# Patient Record
Sex: FEMALE | NOT HISPANIC OR LATINO | URBAN - METROPOLITAN AREA
[De-identification: names, ages, dates, MRNs, and addresses within clinical notes are randomized per-mention and may not be internally consistent; named-entity substitution may affect disease eponyms.]

---

## 2017-06-05 ENCOUNTER — IMPORTED ENCOUNTER (OUTPATIENT)
Dept: URBAN - METROPOLITAN AREA CLINIC 31 | Facility: CLINIC | Age: 77
End: 2017-06-05

## 2017-06-05 PROBLEM — H25.013: Noted: 2017-06-05

## 2017-06-05 PROBLEM — H04.123: Noted: 2017-06-05

## 2017-06-05 PROCEDURE — 92310 CONTACT LENS FITTING OU: CPT

## 2017-06-05 PROCEDURE — 92015 DETERMINE REFRACTIVE STATE: CPT

## 2017-06-05 PROCEDURE — 92014 COMPRE OPH EXAM EST PT 1/>: CPT

## 2017-06-05 NOTE — PATIENT DISCUSSION
1.  Cataract OU: ---NS and PSC-  getting little worse. Explained how cataracts can effect vision. Recommend clinical observation. The patient was advised to contact us if any change or worsening of vision. 2. Dry Eye OU:  Continue current management with Artificial Tears. spk ou3. Cont with current cls Air OPtix MONO 2.25/5. 25. Disc going with AO with Hudraglyde but had no samples in stock . better for oxygen and dryness. Eval 90.4. Atopic Dermatitis on lids-  Pt has problems with it off and on. Cont with Maxtrol mario alberto prn for lids. Needs to use about every 3-4 weeks. Rx Maxtrol mario alberto prn5. Gave copies of rx cls and gls. 6.   RTN 1 yr CE

## 2018-05-23 ENCOUNTER — IMPORTED ENCOUNTER (OUTPATIENT)
Dept: URBAN - METROPOLITAN AREA CLINIC 31 | Facility: CLINIC | Age: 78
End: 2018-05-23

## 2018-05-23 PROBLEM — H25.813: Noted: 2018-05-23

## 2018-05-23 PROBLEM — H25.013: Noted: 2018-05-23

## 2018-05-23 PROBLEM — H04.123: Noted: 2018-05-23

## 2018-05-23 PROBLEM — H01.119: Noted: 2018-05-23

## 2018-05-23 PROCEDURE — 92015 DETERMINE REFRACTIVE STATE: CPT

## 2018-05-23 PROCEDURE — 92014 COMPRE OPH EXAM EST PT 1/>: CPT

## 2018-05-23 NOTE — PATIENT DISCUSSION
1. Combined Types of Cataract OU:   getting worse and affecting visions. Rx change is not helping to improve visions. Explained how cataracts can effect vision. Schedule eval-  Pt likes MONO. OD distance OS Near2. Dry Eye OU:  Continue current management with Artificial Tears. spk ou3. Been wearing  Air OPtix Hydraglyde MONO 2.25/5. 25.  4.  Atopic Dermatitis on lids-  Pt has problems with it off and on. Gave rx for Lotemax mario alberto for lids. POss allergy to  Maxitrol. Needs to use about every 3-4 weeks. 5.  Corneal deposits due to the Amiodarone6. Schedule Cataract eval with DR Barger-  Pt has had MONO for many years and very happy. OD is distance.

## 2018-07-05 ENCOUNTER — IMPORTED ENCOUNTER (OUTPATIENT)
Dept: URBAN - METROPOLITAN AREA CLINIC 31 | Facility: CLINIC | Age: 78
End: 2018-07-05

## 2018-07-05 PROBLEM — H25.13: Noted: 2018-07-05

## 2018-07-05 PROCEDURE — 99214 OFFICE O/P EST MOD 30 MIN: CPT

## 2018-07-05 NOTE — PATIENT DISCUSSION
Cataract OU: Discussed the risks benefits alternatives and limitations of cataract surgery including infection bleeding loss of vision retinal tears detachment. The patient stated a full understanding and a desire to proceed with the surgery in both eyes. Refractive options were reviewed. Patient has elected to have monovision and has tried monovision in the past.  The patient will have ORA guidance to confirm lens power choice. There is no guarantee of perfect uncorrected acuity and the possible need for enhancement was discussed. The patient may need glasses for night driving and to fine tune the vision. OD/OS. OD pore Tappen OS pore reading. No CL wear 7 days prior to Admit. Needs medical clearance for A-Fib. Return for an appointment for [address] [place] [address] with Dr. Jensen Bennett.

## 2018-08-14 ENCOUNTER — IMPORTED ENCOUNTER (OUTPATIENT)
Dept: URBAN - METROPOLITAN AREA CLINIC 31 | Facility: CLINIC | Age: 78
End: 2018-08-14

## 2018-08-14 PROBLEM — H25.13: Noted: 2018-08-14

## 2018-08-14 PROCEDURE — 76519 ECHO EXAM OF EYE: CPT

## 2018-08-14 PROCEDURE — 92025 CPTRIZED CORNEAL TOPOGRAPHY: CPT

## 2018-08-14 NOTE — PATIENT DISCUSSION
Cataract OU: Discussed the risks benefits alternatives and limitations of cataract surgery including infection bleeding loss of vision retinal tears detachment. The patient stated a full understanding and a desire to proceed with the surgery in both eyes. Refractive options were reviewed. Patient has elected to have monovision and has tried monovision in the past.  The patient will have ORA guidance to confirm lens power choice. There is no guarantee of perfect uncorrected acuity and the possible need for enhancement was discussed. The patient may need glasses for night driving and to fine tune the vision.  Option of CRI OD for distance reviewed and pt has elected to proceed

## 2018-08-28 ENCOUNTER — IMPORTED ENCOUNTER (OUTPATIENT)
Dept: URBAN - METROPOLITAN AREA CLINIC 31 | Facility: CLINIC | Age: 78
End: 2018-08-28

## 2018-08-28 PROBLEM — Z96.1: Noted: 2018-08-28

## 2018-08-28 PROCEDURE — 99024 POSTOP FOLLOW-UP VISIT: CPT

## 2018-08-28 NOTE — PATIENT DISCUSSION
Pseudophakia OD - Post-Op Day #1 - Cataract Surgery Right Eye (OD) - doing well. Continue po drops as instructed. Call office with symptoms of pain redness or decreased vision right eye.

## 2018-09-04 ENCOUNTER — IMPORTED ENCOUNTER (OUTPATIENT)
Dept: URBAN - METROPOLITAN AREA CLINIC 31 | Facility: CLINIC | Age: 78
End: 2018-09-04

## 2018-09-04 PROBLEM — Z96.1: Noted: 2018-09-04

## 2018-09-04 PROCEDURE — 99024 POSTOP FOLLOW-UP VISIT: CPT

## 2018-09-04 NOTE — PATIENT DISCUSSION
1.  Pseudophakia OD 1 week with CRI-- -Dist OD-MONO Surgery very well healed. Continue to use artificial tears as needed for ocular surface dryness. Finish po drops according to schedule.   2.  OS cat sx 1 week

## 2018-09-11 ENCOUNTER — IMPORTED ENCOUNTER (OUTPATIENT)
Dept: URBAN - METROPOLITAN AREA CLINIC 31 | Facility: CLINIC | Age: 78
End: 2018-09-11

## 2018-09-11 PROBLEM — Z96.1: Noted: 2018-09-11

## 2018-09-11 PROCEDURE — 99024 POSTOP FOLLOW-UP VISIT: CPT

## 2018-09-11 NOTE — PATIENT DISCUSSION
1.  Pseudophakia OD- 2 weeks PO.   dist MONO-- doing great. 2. Pseudophakia OS - Post-Op Day #1 - Cataract Surgery Left Eye (OS) - Near EYE--doing well. Continue po drops as instructed. Call office with symptoms of pain redness or decreased vision left eye. 3. Pt can use OTc prn till healed. corneal edema present.  4.  RTN 1 week PO

## 2018-09-18 ENCOUNTER — IMPORTED ENCOUNTER (OUTPATIENT)
Dept: URBAN - METROPOLITAN AREA CLINIC 31 | Facility: CLINIC | Age: 78
End: 2018-09-18

## 2018-09-18 PROBLEM — Z96.1: Noted: 2018-09-18

## 2018-09-18 PROCEDURE — 99024 POSTOP FOLLOW-UP VISIT: CPT

## 2018-09-18 NOTE — PATIENT DISCUSSION
1.  Pseudophakia OU - IOLs stable. MONO--Monitor. PT doing great and very happy. Finish meds and increase tears qid. 2.   NO rx needed. 3.   RTN 1 mth PO

## 2018-10-24 ENCOUNTER — IMPORTED ENCOUNTER (OUTPATIENT)
Dept: URBAN - METROPOLITAN AREA CLINIC 31 | Facility: CLINIC | Age: 78
End: 2018-10-24

## 2018-10-24 PROBLEM — Z96.1: Noted: 2018-10-24

## 2018-10-24 PROBLEM — H01.119: Noted: 2018-10-24

## 2018-10-24 PROBLEM — H25.013: Noted: 2018-10-24

## 2018-10-24 PROBLEM — H04.123: Noted: 2018-10-24

## 2018-10-24 PROBLEM — H25.813: Noted: 2018-10-24

## 2018-10-24 PROBLEM — H01.113: Noted: 2018-10-24

## 2018-10-24 PROCEDURE — 99024 POSTOP FOLLOW-UP VISIT: CPT

## 2018-10-24 NOTE — PATIENT DISCUSSION
1.  Dry Eye OU:  Continue with Artificial Tears. spk ou2. Atopic Dermatitis on lids-  Od is irritated today---Pt has problems with it off and on. Gave rx for Lotemax mario alberto for lids. POss allergy to  Maxitrol. Needs to use about every 3-4 weeks. 3. Corneal deposits due to the Amiodarone4  Pseudophakia OU - IOLs stable. MONO--Monitor. OD dist and OS Near- PT doing great and very happy. 5. NO rx needed. 6.  RTN 5 mth CE

## 2019-05-22 ENCOUNTER — IMPORTED ENCOUNTER (OUTPATIENT)
Dept: URBAN - METROPOLITAN AREA CLINIC 31 | Facility: CLINIC | Age: 79
End: 2019-05-22

## 2019-05-22 PROBLEM — H25.813: Noted: 2019-05-22

## 2019-05-22 PROBLEM — H01.113: Noted: 2019-05-22

## 2019-05-22 PROBLEM — H01.119: Noted: 2019-05-22

## 2019-05-22 PROBLEM — Z96.1: Noted: 2019-05-22

## 2019-05-22 PROBLEM — H25.013: Noted: 2019-05-22

## 2019-05-22 PROBLEM — H04.123: Noted: 2019-05-22

## 2019-05-22 PROCEDURE — 92014 COMPRE OPH EXAM EST PT 1/>: CPT

## 2019-05-22 NOTE — PATIENT DISCUSSION
1.  Dry Eye OU:  Continue with Artificial Tears. spk ou2. Atopic Dermatitis on lids-  Od is irritated today---Pt has problems with it off and on. Gave rx for Lotemax mario alberto for lids. POss allergy to  Maxitrol. Needs to use about every 3-4 weeks. 3. Corneal deposits due to the Amiodarone4  Pseudophakia OU - IOLs stable. MONO--Monitor. OD dist and OS Near- PT doing great and very happy. Trace PCO OD--5. NO rx needed. 6.  RTN  12 mth CE

## 2020-05-06 ENCOUNTER — IMPORTED ENCOUNTER (OUTPATIENT)
Dept: URBAN - METROPOLITAN AREA CLINIC 31 | Facility: CLINIC | Age: 80
End: 2020-05-06

## 2020-05-06 PROBLEM — H01.113: Noted: 2020-05-06

## 2020-05-06 PROBLEM — H04.123: Noted: 2020-05-06

## 2020-05-06 PROBLEM — Z96.1: Noted: 2020-05-06

## 2020-05-06 PROBLEM — H25.013: Noted: 2020-05-06

## 2020-05-06 PROBLEM — H25.813: Noted: 2020-05-06

## 2020-05-06 PROBLEM — H01.119: Noted: 2020-05-06

## 2020-05-06 PROCEDURE — 92015 DETERMINE REFRACTIVE STATE: CPT

## 2020-05-06 PROCEDURE — 92014 COMPRE OPH EXAM EST PT 1/>: CPT

## 2020-05-06 NOTE — PATIENT DISCUSSION
1.  Dry Eye OU:  Continue with Artificial Tears. spk ou2. Atopic Dermatitis on lids-  Od is irritated today---Pt has problems with it off and on. Gave rx for Lotemax mario alberto for lids. POss allergy to  Maxitrol. Needs to use about every 3-4 weeks. 3. Corneal deposits due to the Amiodarone4  Pseudophakia OU - IOLs stable. MONO--Monitor. OD dist and OS Near- PT doing great and very happy. Trace PCO OD--5. NO rx needed. 6. RTN  12 mth Harish passed 3/19.  Goes to [place] in summer

## 2021-01-07 ENCOUNTER — IMPORTED ENCOUNTER (OUTPATIENT)
Dept: URBAN - METROPOLITAN AREA CLINIC 31 | Facility: CLINIC | Age: 81
End: 2021-01-07

## 2021-01-07 PROBLEM — Z96.1: Noted: 2021-01-07

## 2021-01-07 PROBLEM — H25.013: Noted: 2021-01-07

## 2021-01-07 PROBLEM — H26.492: Noted: 2021-01-07

## 2021-01-07 PROBLEM — H01.113: Noted: 2021-01-07

## 2021-01-07 PROBLEM — H25.813: Noted: 2021-01-07

## 2021-01-07 PROBLEM — H01.119: Noted: 2021-01-07

## 2021-01-07 PROBLEM — H04.123: Noted: 2021-01-07

## 2021-01-07 PROCEDURE — 92014 COMPRE OPH EXAM EST PT 1/>: CPT

## 2021-01-07 PROCEDURE — 92015 DETERMINE REFRACTIVE STATE: CPT

## 2021-01-07 NOTE — PATIENT DISCUSSION
1.  PCO  OS (Posterior Capsule Opacification)   ----PCO is visually significant and impairment of vision does not meet the patient’s functional needs or interferes with activities of daily living. Consult DR Darreld Kussmaul for Yag OS-- wants in [place]. 2. Dry Eye OU:  Continue with Artificial Tears. spk ou3. Atopic Dermatitis on lids-  Od is irritated today---Pt has problems with it off and on. Gave rx for Lotemax mario alberto for lids. POss allergy to  Maxitrol. Needs to use about every 3-4 weeks. 4. Corneal deposits due to the Amiodarone5  Pseudophakia OU - IOLs stable. MONO--Monitor. OD dist/OS Near-   PCO OS affecting visions---6. Consult DR Connor for Yag OS in [place]--- passed 3/19.  Goes to [place] in summer--- Pt is good friends with Nisha Formans

## 2021-01-28 ENCOUNTER — IMPORTED ENCOUNTER (OUTPATIENT)
Dept: URBAN - METROPOLITAN AREA CLINIC 31 | Facility: CLINIC | Age: 81
End: 2021-01-28

## 2021-01-28 PROBLEM — H26.492: Noted: 2021-01-28

## 2021-01-28 PROBLEM — Z96.1: Noted: 2021-01-28

## 2021-01-28 PROBLEM — H04.123: Noted: 2021-01-28

## 2021-01-28 PROCEDURE — 99213 OFFICE O/P EST LOW 20 MIN: CPT

## 2021-01-28 NOTE — PATIENT DISCUSSION
1.  PCO  OS (Posterior Capsule Opacification)   PCO is visually significant and impairment of vision does not meet the patient’s functional needs or interferes with activities of daily living. Risks benefits and alternatives to the Nd:YAG Laser reviewed including elevated IOP immediately postop and retinal tear/detachment. Patient to notify their ophthalmologist promptly if they have a significant change in symptoms such as flashes of light (photopsia) an increase in floaters loss of visual field or decrease in visual acuity after the procedure. Patient will be scheduled in [place] [id number] for Nd:YAG Laser. Schedule Yag Caps OS2. Pseudophakia OU - IOLs stable. Monitor for changes in vision. 3. Dry Eye OU:  Continue current management with Artificial Tears.   4. 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

## 2021-02-12 ENCOUNTER — IMPORTED ENCOUNTER (OUTPATIENT)
Dept: URBAN - METROPOLITAN AREA CLINIC 31 | Facility: CLINIC | Age: 81
End: 2021-02-12

## 2021-02-12 PROBLEM — Z96.1: Noted: 2021-02-12

## 2021-02-12 PROBLEM — H25.013: Noted: 2021-02-12

## 2021-02-12 PROBLEM — Z98.89: Noted: 2021-02-12

## 2021-02-12 PROBLEM — H01.119: Noted: 2021-02-12

## 2021-02-12 PROBLEM — H26.492: Noted: 2021-02-12

## 2021-02-12 PROBLEM — H25.813: Noted: 2021-02-12

## 2021-02-12 PROBLEM — H01.113: Noted: 2021-02-12

## 2021-02-12 PROBLEM — H04.123: Noted: 2021-02-12

## 2021-02-12 PROCEDURE — 99024 POSTOP FOLLOW-UP VISIT: CPT

## 2021-02-12 NOTE — PATIENT DISCUSSION
1.  PCO  OS (Posterior Capsule Opacification)   ----OPen capsule  2. Dry Eye OU:  Continue with Artificial Tears. spk ou3. Atopic Dermatitis on lids-  Od is irritated today---Pt has problems with it off and on. Gave rx for Lotemax mario alberto for lids. POss allergy to  Maxitrol. Needs to use about every 3-4 weeks. 4. Corneal deposits due to the Amiodarone5  Pseudophakia OU - IOLs stable. MONO--Monitor. OD dist /OS Near-   OPen capsule OS---6. RTN 4 mths DFTA --aftre Yag OS--- passed 3/19.  Goes to [place] in summer--- Pt is good friends with Harriet Hernandez

## 2021-02-12 NOTE — PATIENT DISCUSSION
s/p YAG laser capsulotomy for Posterior Capsule Opacification (PCO) in the Left Eye (OS). Doing better. Please contact us if you have a significant change in symptoms such as flashes of light (photopsia) increased floaters decrease/loss of visual field or visual acuity.

## 2021-05-12 ENCOUNTER — IMPORTED ENCOUNTER (OUTPATIENT)
Dept: URBAN - METROPOLITAN AREA CLINIC 31 | Facility: CLINIC | Age: 81
End: 2021-05-12

## 2021-05-12 PROBLEM — Z96.1: Noted: 2021-05-12

## 2021-05-12 PROBLEM — H25.013: Noted: 2021-05-12

## 2021-05-12 PROBLEM — H01.119: Noted: 2021-05-12

## 2021-05-12 PROBLEM — H04.123: Noted: 2021-05-12

## 2021-05-12 PROBLEM — H01.113: Noted: 2021-05-12

## 2021-05-12 PROBLEM — H25.813: Noted: 2021-05-12

## 2021-05-12 PROBLEM — H26.492: Noted: 2021-05-12

## 2021-05-12 PROBLEM — H01.114: Noted: 2021-05-12

## 2021-05-12 PROBLEM — H01.111: Noted: 2021-05-12

## 2021-05-12 PROCEDURE — 99214 OFFICE O/P EST MOD 30 MIN: CPT

## 2021-05-12 NOTE — PATIENT DISCUSSION
1.  Dry Eye OU:  Continue with Artificial Tears. spk ou2. Atopic Dermatitis on lids-  Od is irritated today---Pt has problems with it off and on. Gave rx for Lotemax mario alberto for lids. POss allergy to  Maxitrol. Needs to use about every 3-4 weeks. 3. Corneal deposits due to the Amiodarone4  Pseudophakia OU - IOLs stable. MONO--OD dist /OS Near-OD Capsule clear OS open---5. RTN 1 yr CE   passed 3/19.  Goes to [place] in summer--- Pt is good friends with Robin Temple

## 2022-04-02 ASSESSMENT — TONOMETRY
OS_IOP_MMHG: 12
OS_IOP_MMHG: 17
OD_IOP_MMHG: 18
OS_IOP_MMHG: 16
OD_IOP_MMHG: 17
OD_IOP_MMHG: 16
OD_IOP_MMHG: 16
OD_IOP_MMHG: 12
OD_IOP_MMHG: 16
OD_IOP_MMHG: 15
OS_IOP_MMHG: 15
OS_IOP_MMHG: 10
OS_IOP_MMHG: 16
OD_IOP_MMHG: 10
OS_IOP_MMHG: 17
OD_IOP_MMHG: 16
OS_IOP_MMHG: 16
OS_IOP_MMHG: 16
OS_IOP_MMHG: 17
OD_IOP_MMHG: 19
OS_IOP_MMHG: 16
OD_IOP_MMHG: 14
OS_IOP_MMHG: 16
OD_IOP_MMHG: 16

## 2022-04-02 ASSESSMENT — VISUAL ACUITY
OD_CC: 20/30
OD_GLARE: 20/50
OD_CC: 20/30
OD_CC: 20/30
OS_CC: 20/30
OD_SC: 20/200
OD_SC: 20/80
OD_SC: 20/200
OS_SC: 20/30
OS_CC: 20/60-1
OS_SC: 20/50+3
OU_SC: 20/30
OD_SC: 20/40
OD_CC: 20/25
OD_CC: 20/30-2
OD_CC: 20/30+2
OS_SC: 20/30
OD_CC: 20/30
OS_CC: 20/30
OS_CC: 20/30
OD_SC: 20/50
OS_SC: 20/40
OD_CC: 20/30
OS_SC: 20/30
OS_CC: 20/60
OS_CC: 20/60
OS_SC: 20/30
OS_CC: 20/100
OS_CC: 20/60+2
OS_SC: 20/200
OS_SC: 20/50
OD_CC: 20/30-2
OD_SC: 20/100
OD_SC: 20/200
OS_CC: 20/50
OD_SC: 20/30
OD_CC: 20/40
OS_GLARE: 20/60MED
OS_SC: 20/30
OS_CC: 20/60
OS_CC: 20/100
OS_SC: 20/30
OD_SC: 20/200
OD_CC: 20/30+2
OS_GLARE: 20/50
OS_SC: 20/30
OD_SC: 20/25-2
OD_GLARE: 20/60MED

## 2022-11-02 ENCOUNTER — PREPPED CHART (OUTPATIENT)
Dept: URBAN - METROPOLITAN AREA CLINIC 34 | Facility: CLINIC | Age: 82
End: 2022-11-02

## 2022-11-02 NOTE — PATIENT DISCUSSION
Atopic Dermatitis on lids- Od is irritated today---Pt has problems with it off and on. Gave rx for Lotemax mario alberto for lids. POss allergy to Maxitrol. Needs to use about every 3-4 weeks.

## 2022-11-09 ENCOUNTER — COMPREHENSIVE EXAM (OUTPATIENT)
Dept: URBAN - METROPOLITAN AREA CLINIC 34 | Facility: CLINIC | Age: 82
End: 2022-11-09

## 2022-11-09 DIAGNOSIS — Z96.1: ICD-10-CM

## 2022-11-09 DIAGNOSIS — H04.123: ICD-10-CM

## 2022-11-09 PROCEDURE — 92015 DETERMINE REFRACTIVE STATE: CPT

## 2022-11-09 PROCEDURE — 92014 COMPRE OPH EXAM EST PT 1/>: CPT

## 2022-11-09 ASSESSMENT — VISUAL ACUITY
OU_SC: 20/30
OU_SC: 20/30-2

## 2022-11-09 ASSESSMENT — TONOMETRY
OS_IOP_MMHG: 16
OD_IOP_MMHG: 16

## 2022-11-09 NOTE — PATIENT DISCUSSION
Atopic Dermatitis on lids- NOne lately---Pt has problems with it off and on. Gave rx for Lotemax mario alberto for lids. POss allergy to Maxitrol. Needs to use about every 3-4 weeks.

## 2023-11-10 ENCOUNTER — COMPREHENSIVE EXAM (OUTPATIENT)
Dept: URBAN - METROPOLITAN AREA CLINIC 34 | Facility: CLINIC | Age: 83
End: 2023-11-10

## 2023-11-10 DIAGNOSIS — Z96.1: ICD-10-CM

## 2023-11-10 PROCEDURE — 92014 COMPRE OPH EXAM EST PT 1/>: CPT

## 2023-11-10 PROCEDURE — 92015 DETERMINE REFRACTIVE STATE: CPT

## 2023-11-10 ASSESSMENT — TONOMETRY
OD_IOP_MMHG: 16
OS_IOP_MMHG: 16

## 2023-11-10 ASSESSMENT — VISUAL ACUITY
OS_SC: J3
OD_SC: J16
OD_SC: 20/30
OS_SC: 20/50

## 2024-04-22 NOTE — PATIENT DISCUSSION
Discussed lid hygiene, warm compress and eyelid wash. Dr. Coates - FYI [facility] sent, but unable to reach pt to confirm and schedule the appt you offered.   Next appt 5/10/24 scheduled.     Future Appointments   Date Time Provider Department Center   5/2/2024  3:20 PM [facility]   5/10/2024 10:00 AM Rayna Coates MD [facility]

## 2024-11-12 ENCOUNTER — COMPREHENSIVE EXAM (OUTPATIENT)
Dept: URBAN - METROPOLITAN AREA CLINIC 34 | Facility: CLINIC | Age: 84
End: 2024-11-12

## 2024-11-12 DIAGNOSIS — H52.4: ICD-10-CM

## 2024-11-12 DIAGNOSIS — H04.123: ICD-10-CM

## 2024-11-12 DIAGNOSIS — H01.114: ICD-10-CM

## 2024-11-12 DIAGNOSIS — Z96.1: ICD-10-CM

## 2024-11-12 DIAGNOSIS — H01.111: ICD-10-CM

## 2024-11-12 PROCEDURE — 92015 DETERMINE REFRACTIVE STATE: CPT

## 2024-11-12 PROCEDURE — 92014 COMPRE OPH EXAM EST PT 1/>: CPT

## 2025-05-30 ENCOUNTER — EMERGENCY VISIT (OUTPATIENT)
Age: 85
End: 2025-05-30

## 2025-05-30 DIAGNOSIS — H01.111: ICD-10-CM

## 2025-05-30 DIAGNOSIS — H01.114: ICD-10-CM

## 2025-05-30 PROCEDURE — 99214 OFFICE O/P EST MOD 30 MIN: CPT
